# Patient Record
Sex: FEMALE | Race: WHITE | NOT HISPANIC OR LATINO | Employment: OTHER | URBAN - METROPOLITAN AREA
[De-identification: names, ages, dates, MRNs, and addresses within clinical notes are randomized per-mention and may not be internally consistent; named-entity substitution may affect disease eponyms.]

---

## 2022-02-14 DIAGNOSIS — Z01.419 ROUTINE GYNECOLOGICAL EXAMINATION: Primary | ICD-10-CM

## 2022-02-18 ENCOUNTER — TELEPHONE (OUTPATIENT)
Dept: OBSTETRICS AND GYNECOLOGY | Facility: CLINIC | Age: 52
End: 2022-02-18
Payer: COMMERCIAL

## 2022-02-18 NOTE — TELEPHONE ENCOUNTER
----- Message from Shante Pizano sent at 2/18/2022  1:18 PM CST -----  Contact: Patient  Patient called to consult with nurse or staff regarding her medical records. She wanted to know if she needed to send those in to the office before the appointment and would like a call back. Patient can be reached at [phone]. Thanks/MR

## 2022-02-18 NOTE — TELEPHONE ENCOUNTER
Returned call to patient. She has decided to give us the information when she comes for her visit.

## 2022-06-09 ENCOUNTER — OFFICE VISIT (OUTPATIENT)
Dept: OBSTETRICS AND GYNECOLOGY | Facility: CLINIC | Age: 52
End: 2022-06-09
Payer: COMMERCIAL

## 2022-06-09 VITALS
WEIGHT: 238.19 LBS | DIASTOLIC BLOOD PRESSURE: 77 MMHG | HEIGHT: 64 IN | SYSTOLIC BLOOD PRESSURE: 120 MMHG | HEART RATE: 120 BPM | BODY MASS INDEX: 40.66 KG/M2

## 2022-06-09 DIAGNOSIS — Z01.419 WELL WOMAN EXAM WITH ROUTINE GYNECOLOGICAL EXAM: Primary | ICD-10-CM

## 2022-06-09 DIAGNOSIS — Z13.820 SCREENING FOR OSTEOPOROSIS: ICD-10-CM

## 2022-06-09 PROCEDURE — 99386 PR PREVENTIVE VISIT,NEW,40-64: ICD-10-PCS | Mod: S$GLB,,, | Performed by: OBSTETRICS & GYNECOLOGY

## 2022-06-09 PROCEDURE — 99386 PREV VISIT NEW AGE 40-64: CPT | Mod: S$GLB,,, | Performed by: OBSTETRICS & GYNECOLOGY

## 2022-06-09 RX ORDER — ESOMEPRAZOLE MAGNESIUM 40 MG/1
CAPSULE, DELAYED RELEASE ORAL
COMMUNITY
Start: 2022-05-09

## 2022-06-09 RX ORDER — ATORVASTATIN CALCIUM 20 MG/1
TABLET, FILM COATED ORAL
COMMUNITY

## 2022-06-09 RX ORDER — TRAZODONE HYDROCHLORIDE 50 MG/1
TABLET ORAL
COMMUNITY
Start: 2022-05-01

## 2022-06-09 RX ORDER — POTASSIUM CHLORIDE 750 MG/1
TABLET, EXTENDED RELEASE ORAL
COMMUNITY
Start: 2022-06-03

## 2022-06-09 RX ORDER — DAPAGLIFLOZIN 5 MG/1
TABLET, FILM COATED ORAL
COMMUNITY
Start: 2022-05-01

## 2022-06-09 RX ORDER — METFORMIN HYDROCHLORIDE 750 MG/1
TABLET, EXTENDED RELEASE ORAL
COMMUNITY
Start: 2022-04-28 | End: 2023-06-26

## 2022-06-09 RX ORDER — TRIAMCINOLONE ACETONIDE 1 MG/G
CREAM TOPICAL
COMMUNITY
Start: 2022-05-09 | End: 2023-06-26

## 2022-06-09 RX ORDER — VALSARTAN 320 MG/1
TABLET ORAL
COMMUNITY
Start: 2022-03-23

## 2022-06-09 RX ORDER — SEMAGLUTIDE 1.34 MG/ML
INJECTION, SOLUTION SUBCUTANEOUS
COMMUNITY
End: 2023-06-26

## 2022-06-09 RX ORDER — HYDROCHLOROTHIAZIDE 25 MG/1
TABLET ORAL
COMMUNITY
Start: 2022-05-24

## 2022-06-09 RX ORDER — BUPROPION HYDROCHLORIDE 300 MG/1
TABLET ORAL
COMMUNITY
Start: 2022-04-13

## 2022-06-09 RX ORDER — FERROUS SULFATE TAB 325 MG (65 MG ELEMENTAL FE) 325 (65 FE) MG
TAB ORAL
COMMUNITY
Start: 2022-01-24 | End: 2023-06-26

## 2022-06-09 NOTE — PROGRESS NOTES
Eugenei Day is a 52 y.o. female  who presents for a well woman exam.  She has no issues, problems, or complaints. She stopped having periods about a year and a half ago. She has some mild hot flashes but tolerable.     Past Medical History:   Diagnosis Date    Depression     History of endometriosis     Hypertension 2017       Past Surgical History:   Procedure Laterality Date    COLONOSCOPY      ECTOPIC PREGNANCY SURGERY      HIP SURGERY      HYSTEROSCOPY      KNEE SURGERY      LEG SURGERY      TONSILLECTOMY      TYMPANOSTOMY TUBE PLACEMENT         OB History    Para Term  AB Living   2       2     SAB IAB Ectopic Multiple Live Births       2          # Outcome Date GA Lbr Reinier/2nd Weight Sex Delivery Anes PTL Lv   2 Ectopic            1 Ectopic                Family History   Problem Relation Age of Onset    Diabetes Mother         Type 2 diabetes    Diabetes Father         Type 2 diabetes    Diabetes Sister         Type 2 diabetes       Social History     Tobacco Use    Smoking status: Former Smoker     Packs/day: 1.00     Years: 15.00     Pack years: 15.00     Types: Cigarettes     Quit date: 2017     Years since quittin.4    Smokeless tobacco: Never Used    Tobacco comment: Smoked for about 20 yrs.  Quit in 2017   Substance Use Topics    Alcohol use: Not Currently     Alcohol/week: 1.0 standard drink     Types: 1 Drinks containing 0.5 oz of alcohol per week     Comment: Very seldom do i drink    Drug use: Never         Current Outpatient Medications:     atorvastatin (LIPITOR) 20 MG tablet, , Disp: , Rfl:     buPROPion (WELLBUTRIN XL) 300 MG 24 hr tablet, , Disp: , Rfl:     esomeprazole (NEXIUM) 40 MG capsule, , Disp: , Rfl:     FARXIGA 5 mg Tab tablet, , Disp: , Rfl:     FEROSUL 325 mg (65 mg iron) Tab tablet, , Disp: , Rfl:     hydroCHLOROthiazide (HYDRODIURIL) 25 MG tablet, , Disp: , Rfl:     metFORMIN (GLUCOPHAGE-XR) 750 MG ER 24hr  "tablet, , Disp: , Rfl:     potassium chloride SA (K-DUR,KLOR-CON) 10 MEQ tablet, , Disp: , Rfl:     semaglutide (OZEMPIC) 1 mg/dose (4 mg/3 mL), , Disp: , Rfl:     traZODone (DESYREL) 50 MG tablet, , Disp: , Rfl:     triamcinolone acetonide 0.1% (KENALOG) 0.1 % cream, APPLY THIN COAT TO AFFECTED AREA TWICE A DAY, Disp: , Rfl:     valsartan (DIOVAN) 320 MG tablet, , Disp: , Rfl:      Review of patient's allergies indicates:  No Known Allergies     ROS:  GENERAL: Denies weight gain or weight loss. Feeling well overall.   SKIN: Denies rash or lesions.   HEAD: Denies head injury or headache.   NODES: Denies enlarged lymph nodes.   CHEST: Denies shortness of breath.   CARDIOVASCULAR: Denies palpitations or chest pain.   ABDOMEN: Denies abdominal pain, constipation, diarrhea, nausea, vomiting or rectal bleeding.   URINARY: Denies frequency, dysuria, hematuria, or burning on urination.  REPRODUCTIVE: See HPI.   BREASTS: Denies pain, lumps, or nipple discharge.   HEMATOLOGIC: Denies easy bruisability or excessive bleeding.  MUSCULOSKELETAL: Denies joint pain or swelling.   NEUROLOGIC: Denies syncope or weakness.   PSYCHIATRIC: Denies depression, anxiety or mood swings.    PHYSICAL EXAM:    /77 (BP Location: Left arm, Patient Position: Sitting, BP Method: Large (Automatic))   Pulse (!) 120   Ht 5' 4" (1.626 m)   Wt 108 kg (238 lb 3.2 oz)   LMP 06/01/2021 (Approximate)   BMI 40.89 kg/m²    Body mass index is 40.89 kg/m².     APPEARANCE: Well nourished, well developed, in no acute distress.  AFFECT: WNL, alert and oriented x 3  ABDOMEN: Soft.  No tenderness or masses.  No hepatosplenomegaly.  No hernias.  BREASTS: Symmetrical, no skin changes or visible lesions.  No palpable masses, nipple discharge bilaterally.  PELVIC: Normal external genitalia without lesions.  Normal hair distribution.  Adequate perineal body, normal urethral meatus.  Urethra and bladder without tenderness or masses. Vagina moist and well " rugated without lesions or discharge.  Cervix pink, without lesions, discharge or tenderness.  No significant cystocele or rectocele.  Bimanual exam shows uterus to be normal size, regular, mobile and nontender.  Adnexa without masses or tenderness.        Well woman exam with routine gynecological exam  -     Liquid-based pap smear, screening    Screening for osteoporosis  -     DXA Bone Density Spine And Hip; Future    Mammogram last month with her PCP    Patient was counseled today on A.C.S. Pap guidelines and recommendations for yearly pelvic exams, mammograms and monthly self breast exams; to see her PCP for other health maintenance.     Follow up in 1 year

## 2023-02-16 ENCOUNTER — TELEPHONE (OUTPATIENT)
Dept: OBSTETRICS AND GYNECOLOGY | Facility: CLINIC | Age: 53
End: 2023-02-16
Payer: COMMERCIAL

## 2023-02-16 NOTE — TELEPHONE ENCOUNTER
Pt. States she does not need appt with Dr. Young.  Advised pt. That Dr. Young will be retiring in March.  Pt. Krystyna'd for NP annual with Dr. Allyn Riley.

## 2023-06-26 ENCOUNTER — OFFICE VISIT (OUTPATIENT)
Dept: OBSTETRICS AND GYNECOLOGY | Facility: CLINIC | Age: 53
End: 2023-06-26
Payer: COMMERCIAL

## 2023-06-26 VITALS
DIASTOLIC BLOOD PRESSURE: 96 MMHG | BODY MASS INDEX: 38.07 KG/M2 | SYSTOLIC BLOOD PRESSURE: 133 MMHG | HEIGHT: 64 IN | WEIGHT: 223 LBS

## 2023-06-26 DIAGNOSIS — Z01.419 ENCOUNTER FOR WELL WOMAN EXAM WITH ROUTINE GYNECOLOGICAL EXAM: Primary | ICD-10-CM

## 2023-06-26 DIAGNOSIS — N95.1 MENOPAUSAL STATE: ICD-10-CM

## 2023-06-26 DIAGNOSIS — N95.1 MENOPAUSAL SYNDROME (HOT FLASHES): ICD-10-CM

## 2023-06-26 DIAGNOSIS — Z12.31 BREAST CANCER SCREENING BY MAMMOGRAM: ICD-10-CM

## 2023-06-26 PROBLEM — I10 ESSENTIAL HYPERTENSION: Status: ACTIVE | Noted: 2022-02-07

## 2023-06-26 PROCEDURE — 99396 PREV VISIT EST AGE 40-64: CPT | Mod: S$GLB,,, | Performed by: OBSTETRICS & GYNECOLOGY

## 2023-06-26 PROCEDURE — 99396 PR PREVENTIVE VISIT,EST,40-64: ICD-10-PCS | Mod: S$GLB,,, | Performed by: OBSTETRICS & GYNECOLOGY

## 2023-06-26 RX ORDER — ERGOCALCIFEROL 1.25 MG/1
CAPSULE ORAL
COMMUNITY

## 2023-06-26 RX ORDER — SEMAGLUTIDE 2.68 MG/ML
INJECTION, SOLUTION SUBCUTANEOUS
COMMUNITY
Start: 2023-06-14

## 2023-06-26 RX ORDER — METFORMIN HYDROCHLORIDE 500 MG/1
1 TABLET, EXTENDED RELEASE ORAL DAILY
COMMUNITY

## 2023-06-26 RX ORDER — FERROUS SULFATE 325(65) MG
1 TABLET ORAL DAILY
COMMUNITY

## 2023-06-26 RX ORDER — CLOBETASOL PROPIONATE 0.5 MG/G
CREAM TOPICAL
COMMUNITY

## 2023-06-26 NOTE — PROGRESS NOTES
CC:  WELL WOMAN (menopausal since )  Patient Care Team:  Faina Smith MD as PCP - General (Internal Medicine)  Stan Gardner MD (Interventional Cardiology)  Marcus Albert Jr., MD (Gastroenterology)    NEW PATIENT       HPI:  Patient is a 53 y.o. who presents for her well woman exam today.  History reviewed with patient.   Patient is without complaints or concerns today.  Having hot flashes but manageable at this point    HRT:  never used  HX ABNL PAPS: none    REVIEW OF PRIOR DATA/ HEALTH MAINTENANCE:  LAST ANNUAL:   2022    LAST MMG (screening)- 2023- normal at [facility]   LAST LABS- up to date with PCP     LAST COLONOSCOPY- - by Dr. Albert - q 3 yrs for polyps   LAST DEXA- - normal at [facility]     Past Medical History:   Diagnosis Date    Depression     History of endometriosis     Hypertension      SURGICAL HX:   has a past surgical history that includes Tonsillectomy; Tympanostomy tube placement; Ectopic pregnancy surgery; Leg Surgery; Knee surgery; Hip surgery; Colonoscopy (); Hysteroscopy (); and ankle surgery.    SOCIAL HX:    reports that she quit smoking about 6 years ago. Her smoking use included cigarettes. She has a 15.00 pack-year smoking history. She has never used smokeless tobacco. She reports that she does not currently use alcohol after a past usage of about 1.0 standard drink per week. She reports that she does not use drugs.    FAMILY HX:   family history includes Diabetes in her father, mother, and sister. .    ALLERGIES:  Patient has no known allergies.    Current Outpatient Medications   Medication Instructions    atorvastatin (LIPITOR) 20 MG tablet No dose, route, or frequency recorded.    buPROPion (WELLBUTRIN XL) 300 MG 24 hr tablet No dose, route, or frequency recorded.    clobetasoL (TEMOVATE) 0.05 % cream apply bid prn as needed    ergocalciferol (ERGOCALCIFEROL) 50,000 unit Cap 1 po weekly    esomeprazole (NEXIUM) 40 MG capsule No  "dose, route, or frequency recorded.    FARXIGA 5 mg Tab tablet No dose, route, or frequency recorded.    ferrous sulfate (FEOSOL) 325 mg (65 mg iron) Tab tablet 1 tablet, Oral, Daily    hydroCHLOROthiazide (HYDRODIURIL) 25 MG tablet No dose, route, or frequency recorded.    metFORMIN (GLUCOPHAGE-XR) 500 MG ER 24hr tablet 1 tablet, Oral, Daily    OZEMPIC 2 mg/dose (8 mg/3 mL) PnIj No dose, route, or frequency recorded.    potassium chloride SA (K-DUR,KLOR-CON) 10 MEQ tablet No dose, route, or frequency recorded.    traZODone (DESYREL) 50 MG tablet No dose, route, or frequency recorded.    valsartan (DIOVAN) 320 MG tablet No dose, route, or frequency recorded.     ROS:  CONST:  No fever, chills, fatigue or unexpected changes in weight +hot flashes  CV: No chest pain or palpitations   RESP:  No shortness of breath or cough   GI: No abd pain, vomiting, diarrhea, blood in stool, or changes in bowel mvmts   SKIN: No rashes or lesions  MUSCULOSKELETAL: No joint swelling or pain   PSYCH: No changes in mood or insomia   BREASTS: No asymmetry, lumps, pain, nipple discharge, or skin changes   :  No dysuria, urgency, frequency, hematuria or incontinence           No vag dc, itching, odor or dryness           No pelvic pain, dyspareunia, or abnormal vaginal bleeding     VITALS:  Blood pressure (!) 133/96, height 5' 4" (1.626 m), weight 101.2 kg (223 lb), last menstrual period 06/01/2021.  Body mass index is 38.28 kg/m².     PHYSICAL EXAM-  APPEARANCE: Well appearing, in no acute distress.   NECK: Neck symmetric   CV:  Normal rate   PULM: Normal resp rate, no resp distress, normal resp effort   PSYCH:  Normal mood and affect, cooperative   SKIN: No rashes, lesions, or abnormal bruising   LYMPH: No inguinal or axillary adenopathy   ABD: Soft, without tenderness or masses.   BREAST: Symmetrical, no nipple changes, no skin changes, No palpable masses   PELVIC:  VULVA: Normal female genitalia. No lesions.   URETHRAL MEATUS: No " masses, no significant prolapse.   BLADDER/ URETHRA: No masses or suprapubic tenderness   VAGINA: No lesions. +atrophic changes. No discharge   CVX: No lesions   UTERUS: Normal size/shape, mobile, non-tender   ADNEXA: No masses, tenderness, or fullness   ANUS/ PERINEUM: Normal tone.  No lesions.     *female chaperone present for entire exam    ASSESSMENT and PLAN:  Encounter for well woman exam with routine gynecological exam  -     Liquid-based pap smear, screening    Breast cancer screening by mammogram  -     Mammo Digital Screening Bilat w/ [facility]; Future; Expected date: 07/10/2023    Menopausal state       FOLLOWUP:  1 year for wwe or sooner prn    COUNSELING:  Patient was counseled today on recommendation for yearly pelvic exam, current Pap guidelines, self breast exams, annual screening mammograms, routine screening colonoscopy , and bone density testing.  Discussed vitamin D and calcium supplements as well as weight bearing exercise to decrease risks. Encouraged patient to see her PCP for other health maintenance.

## 2023-06-30 ENCOUNTER — TELEPHONE (OUTPATIENT)
Dept: OBSTETRICS AND GYNECOLOGY | Facility: CLINIC | Age: 53
End: 2023-06-30
Payer: COMMERCIAL

## 2023-06-30 ENCOUNTER — PATIENT MESSAGE (OUTPATIENT)
Dept: OBSTETRICS AND GYNECOLOGY | Facility: CLINIC | Age: 53
End: 2023-06-30
Payer: COMMERCIAL

## 2023-06-30 LAB — Lab: NORMAL

## 2023-06-30 NOTE — PROGRESS NOTES
Please let patient know her pap result is ascus but the hpv is negative so we consider that to be a normal result and we will see her in 1 year for her annual visit.     If she is not on there portal, please encourage her to do so.

## 2023-06-30 NOTE — TELEPHONE ENCOUNTER
Attempted to contact patient, no answer. Left patient voice mail to return call to clinic. Also sent a result note though the patient portal.

## 2023-08-10 DIAGNOSIS — Z12.11 SCREENING FOR COLON CANCER: Primary | ICD-10-CM

## 2024-04-17 ENCOUNTER — PATIENT MESSAGE (OUTPATIENT)
Dept: OBSTETRICS AND GYNECOLOGY | Facility: CLINIC | Age: 54
End: 2024-04-17
Payer: COMMERCIAL

## 2024-07-25 ENCOUNTER — TELEPHONE (OUTPATIENT)
Dept: OBSTETRICS AND GYNECOLOGY | Facility: CLINIC | Age: 54
End: 2024-07-25
Payer: COMMERCIAL

## 2024-09-24 ENCOUNTER — DOCUMENTATION ONLY (OUTPATIENT)
Dept: OBSTETRICS AND GYNECOLOGY | Facility: CLINIC | Age: 54
End: 2024-09-24
Payer: COMMERCIAL

## 2024-10-28 ENCOUNTER — OFFICE VISIT (OUTPATIENT)
Dept: OBSTETRICS AND GYNECOLOGY | Facility: CLINIC | Age: 54
End: 2024-10-28
Payer: COMMERCIAL

## 2024-10-28 VITALS
WEIGHT: 228.63 LBS | DIASTOLIC BLOOD PRESSURE: 71 MMHG | SYSTOLIC BLOOD PRESSURE: 112 MMHG | BODY MASS INDEX: 39.24 KG/M2

## 2024-10-28 DIAGNOSIS — Z12.31 BREAST CANCER SCREENING BY MAMMOGRAM: ICD-10-CM

## 2024-10-28 DIAGNOSIS — N95.0 POSTMENOPAUSAL BLEEDING: ICD-10-CM

## 2024-10-28 DIAGNOSIS — Z01.419 ENCOUNTER FOR WELL WOMAN EXAM WITH ROUTINE GYNECOLOGICAL EXAM: Primary | ICD-10-CM

## 2024-10-28 DIAGNOSIS — Z78.0 ENCOUNTER FOR OSTEOPOROSIS SCREENING IN ASYMPTOMATIC POSTMENOPAUSAL PATIENT: ICD-10-CM

## 2024-10-28 DIAGNOSIS — Z13.820 ENCOUNTER FOR OSTEOPOROSIS SCREENING IN ASYMPTOMATIC POSTMENOPAUSAL PATIENT: ICD-10-CM

## 2024-10-28 DIAGNOSIS — N95.0 PMB (POSTMENOPAUSAL BLEEDING): Primary | ICD-10-CM

## 2024-10-28 PROCEDURE — 99396 PREV VISIT EST AGE 40-64: CPT | Mod: S$PBB,,, | Performed by: OBSTETRICS & GYNECOLOGY

## 2024-11-01 LAB — Lab: NORMAL

## 2024-11-11 ENCOUNTER — TELEPHONE (OUTPATIENT)
Dept: OBSTETRICS AND GYNECOLOGY | Facility: CLINIC | Age: 54
End: 2024-11-11
Payer: COMMERCIAL

## 2025-06-04 ENCOUNTER — PATIENT MESSAGE (OUTPATIENT)
Dept: OBSTETRICS AND GYNECOLOGY | Facility: CLINIC | Age: 55
End: 2025-06-04
Payer: MEDICAID